# Patient Record
Sex: MALE | URBAN - METROPOLITAN AREA
[De-identification: names, ages, dates, MRNs, and addresses within clinical notes are randomized per-mention and may not be internally consistent; named-entity substitution may affect disease eponyms.]

---

## 2021-08-10 ENCOUNTER — APPOINTMENT (RX ONLY)
Dept: URBAN - METROPOLITAN AREA CLINIC 73 | Facility: CLINIC | Age: 51
Setting detail: DERMATOLOGY
End: 2021-08-10

## 2021-08-10 DIAGNOSIS — B07.8 OTHER VIRAL WARTS: ICD-10-CM

## 2021-08-10 DIAGNOSIS — L82.1 OTHER SEBORRHEIC KERATOSIS: ICD-10-CM

## 2021-08-10 DIAGNOSIS — L57.0 ACTINIC KERATOSIS: ICD-10-CM

## 2021-08-10 PROCEDURE — ? TREATMENT REGIMEN

## 2021-08-10 PROCEDURE — 99203 OFFICE O/P NEW LOW 30 MIN: CPT

## 2021-08-10 PROCEDURE — ? COUNSELING

## 2021-08-10 PROCEDURE — ? PRESCRIPTION

## 2021-08-10 PROCEDURE — ? PATIENT SPECIFIC COUNSELING

## 2021-08-10 RX ORDER — PHARMACY COMPOUNDING ACCESSORY
EACH MISCELLANEOUS
Qty: 15 | Refills: 1 | Status: CANCELLED

## 2021-08-10 RX ORDER — PHARMACY COMPOUNDING ACCESSORY
EACH MISCELLANEOUS
Qty: 30 | Refills: 0 | Status: ERX | COMMUNITY
Start: 2021-08-10

## 2021-08-10 RX ADMIN — Medication: at 00:00

## 2021-08-10 ASSESSMENT — LOCATION DETAILED DESCRIPTION DERM
LOCATION DETAILED: LEFT DISTAL PALMAR INDEX FINGER
LOCATION DETAILED: LEFT SUPERIOR LATERAL MIDBACK
LOCATION DETAILED: LEFT PLANTAR FOREFOOT OVERLYING 2ND METATARSAL

## 2021-08-10 ASSESSMENT — LOCATION SIMPLE DESCRIPTION DERM
LOCATION SIMPLE: LEFT PLANTAR SURFACE
LOCATION SIMPLE: LEFT INDEX FINGER
LOCATION SIMPLE: LEFT LOWER BACK

## 2021-08-10 ASSESSMENT — LOCATION ZONE DERM
LOCATION ZONE: FINGER
LOCATION ZONE: TRUNK
LOCATION ZONE: FEET

## 2021-08-10 ASSESSMENT — PAIN INTENSITY VAS: HOW INTENSE IS YOUR PAIN 0 BEING NO PAIN, 10 BEING THE MOST SEVERE PAIN POSSIBLE?: NO PAIN

## 2021-08-10 NOTE — HPI: RASH
How Severe Is Your Rash?: moderate
Is This A New Presentation, Or A Follow-Up?: Rash
Additional History: Patient reports using moisturizer to help. Lesion comes and goes. Pt reports today patch is less active.

## 2021-08-10 NOTE — PROCEDURE: PATIENT SPECIFIC COUNSELING
Detail Level: Zone
- treatment options: ln2 vs chemotherapy \\n- recommend chemotherapy treatment if AKs are numerous \\n- disc pt will have PIH that will fade within months with treatment \\n- disc using Efudex per area every night x 3-4 weeks
- recommended wart cream on foot. Wart cream is done daily until wart is gone.\\n- disc ln2 will cause a blister and make it uncomfortable for excessive walking and running\\n- disc ln2 requires multiple treatments.

## 2021-08-10 NOTE — HPI: SKIN LESION
Is This A New Presentation, Or A Follow-Up?: Skin Lesion
How Severe Is Your Skin Lesion?: moderate
Which Family Member (Optional)?: Paternal grandfather, father

## 2021-08-10 NOTE — HPI: WARTS (VERRUCA)
How Severe Are Your Warts?: severe
Is This A New Presentation, Or A Follow-Up?: Warts
Additional History: Patient tried compound W on L palm.

## 2021-08-10 NOTE — PROCEDURE: TREATMENT REGIMEN
Initiate Treatment: Efudex w/calcipotriene apply a thin amount to affected areas (first L forehead, then R forehead, then nose) bid x 5 days
Detail Level: Zone
Initiate Treatment: efudex/dovonex - apply to affected areas on finger and foot qhs then cover with tape.